# Patient Record
Sex: MALE | Race: WHITE | URBAN - NONMETROPOLITAN AREA
[De-identification: names, ages, dates, MRNs, and addresses within clinical notes are randomized per-mention and may not be internally consistent; named-entity substitution may affect disease eponyms.]

---

## 2018-03-01 ENCOUNTER — OFFICE VISIT (OUTPATIENT)
Dept: URGENT CARE | Age: 38
End: 2018-03-01

## 2018-03-01 VITALS
TEMPERATURE: 97.9 F | HEIGHT: 71 IN | WEIGHT: 248.6 LBS | SYSTOLIC BLOOD PRESSURE: 128 MMHG | HEART RATE: 64 BPM | OXYGEN SATURATION: 98 % | RESPIRATION RATE: 18 BRPM | DIASTOLIC BLOOD PRESSURE: 88 MMHG | BODY MASS INDEX: 34.8 KG/M2

## 2018-03-01 DIAGNOSIS — J02.9 SORE THROAT: ICD-10-CM

## 2018-03-01 DIAGNOSIS — J01.40 ACUTE NON-RECURRENT PANSINUSITIS: Primary | ICD-10-CM

## 2018-03-01 DIAGNOSIS — R52 BODY ACHES: ICD-10-CM

## 2018-03-01 LAB
INFLUENZA A ANTIBODY: NEGATIVE
INFLUENZA B ANTIBODY: NEGATIVE

## 2018-03-01 PROCEDURE — 87804 INFLUENZA ASSAY W/OPTIC: CPT | Performed by: NURSE PRACTITIONER

## 2018-03-01 PROCEDURE — 99201 PR OFFICE OUTPATIENT NEW 10 MINUTES: CPT | Performed by: NURSE PRACTITIONER

## 2018-03-01 RX ORDER — CEPHALEXIN 500 MG/1
500 CAPSULE ORAL 2 TIMES DAILY
Qty: 20 CAPSULE | Refills: 0 | Status: SHIPPED | OUTPATIENT
Start: 2018-03-01

## 2018-03-01 RX ORDER — LORATADINE 10 MG/1
10 TABLET ORAL DAILY
Qty: 30 TABLET | Refills: 1 | Status: SHIPPED | OUTPATIENT
Start: 2018-03-01

## 2018-03-01 ASSESSMENT — ENCOUNTER SYMPTOMS
CHOKING: 0
SORE THROAT: 1

## 2018-03-01 NOTE — PROGRESS NOTES
[address] [facility]  [address] [facility]  [address] [place] [id number]  Dept: [phone]  Loc: [phone]      Liza Kidd is c/o of Congestion; Pharyngitis; Generalized Body Aches (happens with fever); and Fever (on and off for the past week)        HPI:     HPI  Patient presents with Congestion; Pharyngitis; Generalized Body Aches; and Fever. He has taken otc cold relief pills, these have not helped. . The fever comes and goes, he had not checked it but he does get cold chills and body aches at times. Sore throat, sinus pressure, headaches started two weeks ago but worsened in the last two days. History reviewed. No pertinent past medical history. History reviewed. No pertinent surgical history. History reviewed. No pertinent family history. Social History   Substance Use Topics    Smoking status: Never Smoker    Smokeless tobacco: Never Used    Alcohol use No      Current Outpatient Prescriptions   Medication Sig Dispense Refill    loratadine (CLARITIN) 10 MG tablet Take 1 tablet by mouth daily 30 tablet 1    cephALEXin (KEFLEX) 500 MG capsule Take 1 capsule by mouth 2 times daily 20 capsule 0     No current facility-administered medications for this visit. No Known Allergies    Health Maintenance   Topic Date Due    HIV screen  02/12/1995    DTaP/Tdap/Td vaccine (1 - Tdap) 02/12/1999    Flu vaccine (1) 09/01/2017       Subjective:      Review of Systems   Constitutional: Positive for chills and diaphoresis. HENT: Positive for congestion and sore throat. Respiratory: Negative for choking. Neurological: Positive for headaches. All other systems reviewed and are negative. Objective:     Physical Exam   Constitutional: He is oriented to person, place, and time. He appears well-developed and well-nourished. No distress. HENT:   Head: Normocephalic and atraumatic.    Right Ear: External ear normal.   Left Ear: External ear warm gel pack on your face 3 or 4 times a day for 5 to 10 minutes each time. · Try a decongestant nasal spray like oxymetazoline (Afrin). Do not use it for more than 3 days in a row. Using it for more than 3 days can make your congestion worse. When should you call for help? Call your doctor now or seek immediate medical care if:  ? · You have new or worse swelling or redness in your face or around your eyes. ? · You have a new or higher fever. ? Watch closely for changes in your health, and be sure to contact your doctor if:  ? · You have new or worse facial pain. ? · The mucus from your nose becomes thicker (like pus) or has new blood in it. ? · You are not getting better as expected. Where can you learn more? Go to [email or web address] org and sign in to your [employer] account. Enter R910 in the Deep Domain box to learn more about \"Sinusitis: Care Instructions. \"     If you do not have an account, please click on the \"Sign Up Now\" link. Current as of: May 12, 2017  Content Version: 11.5  © 1262-0688 [employer]. Care instructions adapted under license by [facility] ([facility]). If you have questions about a medical condition or this instruction, always ask your healthcare professional. [address]  any warranty or liability for your use of this information. 1. Begin antibiotic today  2. Start Claritin daily  3.  Monitor BP when, Consider establishing [address] [phone]        Electronically signed by BHAVIK Ruby on 3/1/2018 at 10:34 AM